# Patient Record
Sex: MALE | Race: BLACK OR AFRICAN AMERICAN | NOT HISPANIC OR LATINO | Employment: UNEMPLOYED | URBAN - METROPOLITAN AREA
[De-identification: names, ages, dates, MRNs, and addresses within clinical notes are randomized per-mention and may not be internally consistent; named-entity substitution may affect disease eponyms.]

---

## 2023-02-02 PROBLEM — K64.9 HEMORRHOIDS: Status: ACTIVE | Noted: 2023-02-02

## 2023-02-02 PROBLEM — R42 DIZZINESS: Status: ACTIVE | Noted: 2023-02-02

## 2023-02-02 PROBLEM — M25.511 RIGHT SHOULDER PAIN: Status: ACTIVE | Noted: 2023-02-02

## 2023-02-02 PROBLEM — K62.5 PAINLESS RECTAL BLEEDING: Status: ACTIVE | Noted: 2023-02-02

## 2023-02-02 PROBLEM — K59.09 CONSTIPATION, CHRONIC: Status: ACTIVE | Noted: 2023-02-02

## 2023-02-02 PROBLEM — R61 NIGHT SWEATS: Status: ACTIVE | Noted: 2023-02-02

## 2023-02-02 PROBLEM — R73.03 PREDIABETES: Status: ACTIVE | Noted: 2023-02-02

## 2023-02-02 RX ORDER — DOCUSATE SODIUM 100 MG/1
100 CAPSULE, LIQUID FILLED ORAL DAILY
COMMUNITY
End: 2023-03-16

## 2023-02-02 RX ORDER — HYDROCORTISONE 25 MG/G
CREAM TOPICAL AS NEEDED
COMMUNITY
End: 2023-03-16 | Stop reason: ALTCHOICE

## 2023-03-16 ENCOUNTER — OFFICE VISIT (OUTPATIENT)
Dept: PRIMARY CARE | Facility: CLINIC | Age: 53
End: 2023-03-16

## 2023-03-16 VITALS
HEIGHT: 67 IN | BODY MASS INDEX: 23.21 KG/M2 | DIASTOLIC BLOOD PRESSURE: 76 MMHG | WEIGHT: 147.9 LBS | OXYGEN SATURATION: 98 % | HEART RATE: 57 BPM | TEMPERATURE: 97.2 F | SYSTOLIC BLOOD PRESSURE: 114 MMHG

## 2023-03-16 DIAGNOSIS — R19.8 ABNORMAL BOWEL MOVEMENT: ICD-10-CM

## 2023-03-16 DIAGNOSIS — R73.03 PREDIABETES: ICD-10-CM

## 2023-03-16 DIAGNOSIS — K62.5 PAINLESS RECTAL BLEEDING: ICD-10-CM

## 2023-03-16 DIAGNOSIS — F17.200 NICOTINE USE DISORDER: ICD-10-CM

## 2023-03-16 DIAGNOSIS — B36.0 PITYRIASIS VERSICOLOR: ICD-10-CM

## 2023-03-16 DIAGNOSIS — G89.29 CHRONIC RIGHT SHOULDER PAIN: Primary | ICD-10-CM

## 2023-03-16 DIAGNOSIS — M25.511 CHRONIC RIGHT SHOULDER PAIN: Primary | ICD-10-CM

## 2023-03-16 PROBLEM — K59.09 CONSTIPATION, CHRONIC: Status: RESOLVED | Noted: 2023-02-02 | Resolved: 2023-03-16

## 2023-03-16 PROCEDURE — 99213 OFFICE O/P EST LOW 20 MIN: CPT | Performed by: STUDENT IN AN ORGANIZED HEALTH CARE EDUCATION/TRAINING PROGRAM

## 2023-03-16 ASSESSMENT — ENCOUNTER SYMPTOMS
ABDOMINAL PAIN: 0
COUGH: 0
BACK PAIN: 0
RECTAL PAIN: 0
CHILLS: 0
BLOOD IN STOOL: 1
NUMBNESS: 1
ARTHRALGIAS: 1
DIARRHEA: 0
FEVER: 0
SHORTNESS OF BREATH: 0
NECK PAIN: 0
NECK STIFFNESS: 0

## 2023-03-16 ASSESSMENT — PAIN SCALES - GENERAL: PAINLEVEL: 0-NO PAIN

## 2023-03-16 NOTE — ASSESSMENT & PLAN NOTE
-total pack years 11.75 on recalculation, does not qualify for LDCT  -continue to discuss smoking cessation at follow up visits

## 2023-03-16 NOTE — ASSESSMENT & PLAN NOTE
-unclear etiology, consider possible IBS however given lack of improvement with fiber this is not entirely consistent  -colorectal referral as above

## 2023-03-16 NOTE — ASSESSMENT & PLAN NOTE
-provided dietary counseling, patient endorses having made several changes including replacing pop with water and cutting down on sweets

## 2023-03-16 NOTE — PROGRESS NOTES
"Subjective   Patient ID: Cici Nobles is a 52 y.o. male who presents for Follow-up (Pt is here for a follow-up.).    Fell and injured pinky finger on L hand, currently wearing brace and doing ok.    Went to physical therapy a couple times for his R shoulder but didn't notice much improvement after doing the exercises. Shoulder is doing ok, still getting numbness and tingling down the arm and into finger tips, specifically into the 3rd and 4th fingers. Able to use the shoulder ok but it starts feeling numb and painful if sitting still for a while. Symptoms actually get worse with rest rather than with activity. No pain in the neck. No symptoms on the L side. When it flares up feels like his hand gets weak/uncoordinated.    Still having some issues with \"constipation.\" Stool isn't hard, it's soft. Having to still go several times in a row. No change with docusate. Trying to eat more fiber but has noted that he's having more frequent bowel movements. Still having some problems with painless bleeding hemorrhoids.    Was denied for LDCT due to not having a heavy enough smoking history. Had smoked 1/4 ppd cigarettes for about 13 years, then switched to cigarillos 2 per day for 17 years. Total pack years on recalculation is 11.75.        Review of Systems   Constitutional:  Negative for chills and fever.   Respiratory:  Negative for cough and shortness of breath.    Gastrointestinal:  Positive for blood in stool. Negative for abdominal pain, diarrhea and rectal pain.   Musculoskeletal:  Positive for arthralgias. Negative for back pain, neck pain and neck stiffness.   Neurological:  Positive for numbness.       Objective   /76 (BP Location: Right arm, Patient Position: Sitting, BP Cuff Size: Large adult)   Pulse 57   Temp 36.2 °C (97.2 °F) (Temporal)   Ht 1.702 m (5' 7\")   Wt 67.1 kg (147 lb 14.4 oz)   SpO2 98%   BMI 23.16 kg/m²  Body mass index is 23.16 kg/m².    Physical Exam  Vitals reviewed. "   Constitutional:       General: He is not in acute distress.     Appearance: Normal appearance. He is normal weight.   HENT:      Head: Normocephalic and atraumatic.   Neck:      Comments: ROM of the neck does not recreate symptoms  Cardiovascular:      Rate and Rhythm: Normal rate and regular rhythm.      Pulses: Normal pulses.      Heart sounds: No murmur heard.     No friction rub. No gallop.   Pulmonary:      Effort: Pulmonary effort is normal. No respiratory distress.      Breath sounds: Normal breath sounds. No wheezing, rhonchi or rales.   Abdominal:      General: Abdomen is flat. Bowel sounds are normal. There is no distension.      Palpations: Abdomen is soft.      Tenderness: There is no abdominal tenderness. There is no guarding or rebound.   Musculoskeletal:      Right shoulder: No swelling, deformity, tenderness or bony tenderness (indicates that area of pain is over the distal R clavicle). Decreased range of motion (full ROM with abduction, flexion, external rotation, mildly limited internal rotation compared to L). Decreased strength (5/5 strength with abduction, external and internal rotation, minimal weakness with liftoff test, mild R-sided weakness with empty can).      Left shoulder: Normal. No tenderness or bony tenderness. Normal range of motion.      Right elbow: Normal. No swelling or deformity. Normal range of motion. No tenderness.      Right hand: Normal. No tenderness. Normal range of motion. Normal strength. Normal sensation.      Cervical back: Normal range of motion and neck supple. No rigidity or tenderness.      Comments: Negative Hawkin's test, positive Neer's, positive cross arm test.   Skin:     General: Skin is warm and dry.      Findings: Rash (lacy mildly hyperpigmented macular rash on the neck) present.   Neurological:      General: No focal deficit present.      Mental Status: He is alert.         Assessment/Plan   Problem List Items Addressed This Visit          Digestive     Painless rectal bleeding     -suspect hemorrhoids although given that bowel movements are soft it is unclear whether there may be an alternative etiology  -last colonoscopy was negative, recommended for 10 year follow  -referred to colorectal surgery for eval           Relevant Orders    Referral to Colorectal Surgery       Musculoskeletal    Right shoulder pain - Primary     -unclear etiology based on exam and history  -continue to suspect some element of rotator cuff pathology based on limitation of internal rotation and empty can test findings  -location of pain is consistent with prior site of clavicle fracture, possibly post-traumatic degenerative changes  -radicular symptoms following possible C7 distribution, not following a pattern consistent with a brachial plexopathy; given negative neck exam etiology is unclear  -referred to ortho shoulder for further evaluation         Relevant Orders    Referral to Orthopaedic Surgery       Endocrine/Metabolic    Prediabetes     -provided dietary counseling, patient endorses having made several changes including replacing pop with water and cutting down on sweets            Other    Abnormal bowel movement     -unclear etiology, consider possible IBS however given lack of improvement with fiber this is not entirely consistent  -colorectal referral as above         Relevant Orders    Referral to Colorectal Surgery    Nicotine use disorder     -total pack years 11.75 on recalculation, does not qualify for LDCT  -continue to discuss smoking cessation at follow up visits          Follow up in 6 months for reassessment of symptoms after specialist evaluation.    Patient discussed with Dr. Spencer.    Felipe Soares MD  FM PGY-2  [facility]

## 2023-03-16 NOTE — ASSESSMENT & PLAN NOTE
-unclear etiology based on exam and history  -continue to suspect some element of rotator cuff pathology based on limitation of internal rotation and empty can test findings  -location of pain is consistent with prior site of clavicle fracture, possibly post-traumatic degenerative changes  -radicular symptoms following possible C7 distribution, not following a pattern consistent with a brachial plexopathy; given negative neck exam etiology is unclear  -referred to ortho shoulder for further evaluation

## 2023-03-16 NOTE — ASSESSMENT & PLAN NOTE
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

## 2023-03-16 NOTE — PROGRESS NOTES
I reviewed with the resident the medical history and the resident’s findings on physical examination.  I discussed with the resident the patient’s diagnosis and concur with the treatment plan as documented in the resident note.     Brandon Spencer MD

## 2023-03-16 NOTE — ASSESSMENT & PLAN NOTE
-suspect hemorrhoids although given that bowel movements are soft it is unclear whether there may be an alternative etiology  -last colonoscopy was negative, recommended for 10 year follow  -referred to colorectal surgery for eval

## 2023-03-16 NOTE — PATIENT INSTRUCTIONS
Please follow up with the orthopedic shoulder specialist regarding your shoulder pain.    Please follow up with GI/colorectal surgery regarding your bowel movement symptoms. Continue with the high fiber diet in the mean time.